# Patient Record
(demographics unavailable — no encounter records)

---

## 2017-09-21 NOTE — PD
HPI


Chief Complaint:  Medical Clearance


Time Seen by Provider:  01:21


Travel History


International Travel<30 days:  No


Contact w/Intl Traveler<30days:  No


Traveled to known affect area:  No





History of Present Illness


HPI


19-year-old female presents to emergency department requesting a HIV test.  She 

recently had unprotected intercourse and is uncertain of the partner status.  

She has no symptoms and no other exposures, but would like to be tested.





History


Past Medical Histgory


Medical History:  Denies Significant Hx


LMP:  DEPO





Past Surgical History


Surgical History:  No Previous Surgery





Social History


Alcohol Use:  No


Tobacco Use:  No





Allergies-Medications


(Allergen,Severity, Reaction):  


Coded Allergies:  


     No Known Allergies (Verified  Allergy, Unknown, 9/21/17)


Reported Meds & Prescriptions





Reported Meds & Active Scripts


Active








Review of Systems


Except as stated in HPI:  all other systems reviewed are Neg





Physical Exam


Narrative


GENERAL: Well-nourished, well-developed female patient ambulatory and in no 

acute distress


SKIN: Focused skin assessment warm/dry.


HEAD: Normocephalic.


EYES: No scleral icterus. No injection or drainage. 


NECK: Supple, trachea midline. No JVD or lymphadenopathy.


CARDIOVASCULAR: Regular rate and rhythm without murmurs, gallops, or rubs. 


RESPIRATORY: Breath sounds equal bilaterally. No accessory muscle use.


GASTROINTESTINAL: Abdomen soft, non-tender, nondistended. 


MUSCULOSKELETAL: No cyanosis, or edema. 


BACK: Nontender without obvious deformity. No CVA tenderness.





Data


Data


Last Documented VS





Vital Signs








  Date Time  Temp Pulse Resp B/P (MAP) Pulse Ox O2 Delivery O2 Flow Rate FiO2


 


9/21/17 01:22        


 


9/21/17 00:55 98.5 92 14  100 Room Air  











MDM


Medical Screen Exam Complete:  Yes


Emergency Medical Condition:  No


Differential Diagnosis


REQUESTING HIV TESTING


Narrative Course


19-year-old female presents to the emergency department requesting HIV testing.

  Patient wants this secondary to an unprotected sexual intercourse recently.  

I have encouraged her to follow-up with the primary care provider at the 

[facility] for HIV testing as well as other STD testings.  I 

advised her that this would not be free, however at this time there are no 

urgent or emergent needs to do this in the emergency department.


A medical screening exam was performed: 


At the time of evaluation the presenting medical condition was determined not 

to be of an emergent nature. 


The patient was given the option of receiving additional care, but declined. 


Patient was given options for additional community resources from which to 

obtain care.


The Patient Has Been advised to seek medical attention for their presenting 

complaint.


The patient has been advised to return to the ER at any time if an emergent 

condition develops.





 Primary Impression:  


 Encounter for medical screening examination


Condition:  Stable











Alize Hart Sep 21, 2017 01:23

## 2017-10-04 NOTE — PD
HPI


.


Urinary tract infection


Chief Complaint:   Complaint


Time Seen by Provider:  02:19


Travel History


International Travel<30 days:  No


Contact w/Intl Traveler<30days:  No


Traveled to known affect area:  No





History of Present Illness


HPI


Patient presents stating that she thinks that she has urinary tract infection.  

Onset of symptoms was 2 days ago.  She reports dysuria and hematuria.  She 

states that she vomited once earlier today.  She reports bilateral low back 

pain.  No modifying factors.  She rates her pain 10/10.





PFSH


Past Medical History


Medical History:  Denies Significant Hx


Diminished Hearing:  No


Immunizations Current:  Yes


Tetanus Vaccination:  < 5 Years


Pregnant?:  Not Pregnant


LMP:  depo





Past Surgical History


Surgical History:  No Previous Surgery





Social History


Alcohol Use:  No


Tobacco Use:  No


Substance Use:  No





Allergies-Medications


(Allergen,Severity, Reaction):  


Coded Allergies:  


     No Known Allergies (Verified  Allergy, Unknown, 9/21/17)


Reported Meds & Prescriptions





Reported Meds & Active Scripts


Active








Review of Systems


Except as stated in HPI:  all other systems reviewed are Neg


General / Constitutional:  No: Fever, Chills


Gastrointestinal:  Positive: Nausea, Vomiting


Genitourinary:  Positive: Urgency, Frequency, Dysuria, Hematuria, Pelvic Pain, 

Flank Pain





Physical Exam


Narrative


GENERAL: Using the Burch-Baker Faces pain scale, her pain is 0/5.  She is 

smiling and in no distress.


SKIN: Skin is warm and dry with no rash or lesions.


HEAD: Normocephalic/atraumatic.


EYES: Pupils are equal.  Extremities are intact.


ENT: Mucous membranes moist.


NECK: Neck supple with full range of motion.


CARDIOVASCULAR: Regular rate and rhythm.


RESPIRATORY: Nonlabored respirations.


ABDOMEN:  Soft with suprapubic tenderness.  No CVA tenderness.


MUSCULOSKELETAL: Atraumatic.


NEUROLOGICAL: Nonfocal.


PSYCHIATRIC: Appropriate mood and affect.





Data


Data


Last Documented VS





Vital Signs








  Date Time  Temp Pulse Resp B/P (MAP) Pulse Ox O2 Delivery O2 Flow Rate FiO2


 


10/4/17 02:00 98.3 87 16 141/81 (101) 99   








Orders





 Orders


Urinalysis - C+S If Indicated (10/4/17 02:19)


Urine Culture (10/4/17 02:30)





Labs





Laboratory Tests








Test


  10/4/17


02:30


 


Urine Color YELLOW 


 


Urine Turbidity HAZY 


 


Urine pH 6.0 


 


Urine Specific Gravity 1.039 


 


Urine Protein 30 mg/dL 


 


Urine Glucose (UA) NEG mg/dL 


 


Urine Ketones TRACE mg/dL 


 


Urine Occult Blood LARGE 


 


Urine Nitrite NEG 


 


Urine Bilirubin SMALL 


 


Urine Urobilinogen 4.0 MG/DL 


 


Urine Leukocyte Esterase SMALL 


 


Urine RBC  /hpf 


 


Urine WBC 14 /hpf 


 


Urine Squamous Epithelial


Cells 7 /hpf 


 


 


Urine Bacteria RARE /hpf 


 


Urine Hyaline Casts 10 /lpf 


 


Urine Mucus MANY /lpf 


 


Microscopic Urinalysis Comment


  CULTURE


INDICATED











MDM


Medical Decision Making


Medical Screen Exam Complete:  Yes


Emergency Medical Condition:  Yes


Differential Diagnosis


Final differential diagnosis of urinary symptoms includes but is not limited to 

UTI, kidney stone, pyelonephritis, bacterial vaginosis, yeast infection, 

urinary retention


Narrative Course


This patient presents with the chief complaint of dysuria, frequency, urgency 

and hematuria.





UA>>trace ketones, large blood, small LE, innumerable RBCs, 14 WBCs, rare bact.





His UA is compatible with a urinary tract infection.  She will be treated with 

Macrobid and Pyridium.





Diagnosis





 Primary Impression:  


 Hemorrhagic cystitis


Patient Instructions:  General Instructions, Interstitial Cystitis (ED)





***Additional Instructions:  


Drink lots of fluids.  At least 2 L per day.


***Med/Other Pt SpecificInfo:  Prescription(s) given


Scripts


Phenazopyridine (Pyridium) 100 Mg Tab


200 MG PO Q8H Y for DYSURIA, #10 TAB 0 Refills


   Prov: Loren James MD         10/4/17 


Nitrofurantoin Monohydrate Macrocrystals (Macrobid) 100 Mg Cap


100 MG PO BID for Infection for 5 Days, #10 CAP 0 Refills


   Prov: Loren James MD         10/4/17


Disposition:  01 DISCHARGE HOME


Condition:  Stable











Loren James MD Oct 4, 2017 02:35